# Patient Record
Sex: MALE | Race: WHITE | Employment: UNEMPLOYED | URBAN - METROPOLITAN AREA
[De-identification: names, ages, dates, MRNs, and addresses within clinical notes are randomized per-mention and may not be internally consistent; named-entity substitution may affect disease eponyms.]

---

## 2024-04-06 ENCOUNTER — OFFICE VISIT (OUTPATIENT)
Dept: URGENT CARE | Age: 79
End: 2024-04-06

## 2024-04-06 VITALS
DIASTOLIC BLOOD PRESSURE: 73 MMHG | TEMPERATURE: 98.9 F | BODY MASS INDEX: 34.72 KG/M2 | OXYGEN SATURATION: 94 % | SYSTOLIC BLOOD PRESSURE: 121 MMHG | WEIGHT: 262 LBS | HEIGHT: 73 IN | HEART RATE: 100 BPM

## 2024-04-06 DIAGNOSIS — J02.9 SORE THROAT: Primary | ICD-10-CM

## 2024-04-06 PROBLEM — R10.32 ABDOMINAL PAIN, BILATERAL LOWER QUADRANT: Status: ACTIVE | Noted: 2017-09-26

## 2024-04-06 PROBLEM — R10.31 ABDOMINAL PAIN, BILATERAL LOWER QUADRANT: Status: ACTIVE | Noted: 2017-09-26

## 2024-04-06 PROBLEM — R19.7 DIARRHEA: Status: ACTIVE | Noted: 2024-04-06

## 2024-04-06 PROBLEM — M15.9 GENERALIZED OSTEOARTHROSIS: Status: ACTIVE | Noted: 2024-04-06

## 2024-04-06 PROBLEM — L02.215 CUTANEOUS ABSCESS OF PERINEUM: Status: ACTIVE | Noted: 2024-04-06

## 2024-04-06 PROBLEM — L59.8 OTHER SPECIFIED DISORDERS OF THE SKIN AND SUBCUTANEOUS TISSUE RELATED TO RADIATION: Status: ACTIVE | Noted: 2024-04-06

## 2024-04-06 PROBLEM — K21.9 ESOPHAGEAL REFLUX: Status: ACTIVE | Noted: 2024-04-06

## 2024-04-06 PROBLEM — L02.31 ABSCESS, GLUTEAL, LEFT: Status: ACTIVE | Noted: 2019-10-25

## 2024-04-06 PROBLEM — Z93.3 COLOSTOMY STATUS (HCC): Status: ACTIVE | Noted: 2020-01-26

## 2024-04-06 PROBLEM — K59.09 CHRONIC CONSTIPATION: Status: ACTIVE | Noted: 2017-09-26

## 2024-04-06 PROBLEM — Z85.048 PERSONAL HISTORY OF OTHER MALIGNANT NEOPLASM OF RECTUM, RECTOSIGMOID JUNCTION, AND ANUS: Status: ACTIVE | Noted: 2024-04-06

## 2024-04-06 PROBLEM — D49.0 NEOPLASM OF DIGESTIVE SYSTEM: Status: ACTIVE | Noted: 2024-04-06

## 2024-04-06 PROBLEM — M54.50 LOW BACK PAIN: Status: ACTIVE | Noted: 2022-04-26

## 2024-04-06 PROBLEM — M47.819 ARTHROPATHY OF FACET JOINT: Status: ACTIVE | Noted: 2022-04-26

## 2024-04-06 PROBLEM — K65.1 INTRA-ABDOMINAL ABSCESS (HCC): Status: ACTIVE | Noted: 2019-09-26

## 2024-04-06 PROBLEM — K80.10 CHRONIC CHOLECYSTITIS WITH CALCULUS: Status: ACTIVE | Noted: 2017-10-07

## 2024-04-06 PROBLEM — K56.0 PARALYTIC ILEUS (HCC): Status: ACTIVE | Noted: 2017-09-26

## 2024-04-06 PROBLEM — T81.31XA DISRUPTION OF EXTERNAL OPERATION (SURGICAL) WOUND, NOT ELSEWHERE CLASSIFIED, INITIAL ENCOUNTER: Status: ACTIVE | Noted: 2024-04-06

## 2024-04-06 LAB — STREPTOCOCCUS A RNA: NEGATIVE

## 2024-04-06 RX ORDER — MELOXICAM 15 MG/1
1 TABLET ORAL DAILY
COMMUNITY
Start: 2024-02-29

## 2024-04-06 RX ORDER — PREDNISONE 20 MG/1
20 TABLET ORAL 2 TIMES DAILY
Qty: 10 TABLET | Refills: 0 | Status: SHIPPED | OUTPATIENT
Start: 2024-04-06 | End: 2024-04-11

## 2024-04-06 RX ORDER — MOMETASONE FUROATE 1 MG/G
CREAM TOPICAL DAILY
COMMUNITY
Start: 2023-02-01

## 2024-04-06 RX ORDER — LISINOPRIL 20 MG/1
20 TABLET ORAL DAILY
COMMUNITY
Start: 2024-03-06

## 2024-04-06 RX ORDER — DULOXETIN HYDROCHLORIDE 30 MG/1
30 CAPSULE, DELAYED RELEASE ORAL DAILY
COMMUNITY
Start: 2024-01-10

## 2024-04-06 RX ORDER — HYDROCHLOROTHIAZIDE 12.5 MG/1
1 CAPSULE, GELATIN COATED ORAL EVERY MORNING
COMMUNITY
Start: 2024-01-16

## 2024-04-06 RX ORDER — MIRABEGRON 50 MG/1
50 TABLET, FILM COATED, EXTENDED RELEASE ORAL DAILY
COMMUNITY

## 2024-04-06 NOTE — PROGRESS NOTES
Sea Mahajan (:  1945) is a 78 y.o. male,New patient, here for evaluation of the following chief complaint(s):  Congestion (Sinus congestion + pressure, sore throat, headache, minor productive cough x 3-4 days. Denies fever or bodyaches. )      ASSESSMENT/PLAN:    ICD-10-CM    1. Sore throat  J02.9 POCT Rapid Strep A DNA     predniSONE (DELTASONE) 20 MG tablet        Results for POC orders placed in visit on 24   POCT Rapid Strep A DNA   Result Value Ref Range    Streptococcus A RNA Negative       No indication for antibiotic at this time   Suspect viral or allergies as  cause for symptoms     Keep hydrated, tylenol or ibuprofen (if no contraindications) as needed if pain or fever..  follow up in  5-7 days if not better  gargle-cool liquids-ice pops  Over the counter zyrtex/ flonase  Return sooner  if symptoms worse/feeling worse or has new symptoms or concerns      SUBJECTIVE/OBJECTIVE:  Patient presents with:  Congestion: Sinus congestion + pressure, sore throat, headache, minor productive cough x 3-4 days. Denies fever or bodyaches.          History provided by:  Patient      Vitals:    24 1207   BP: 121/73   Site: Left Upper Arm   Position: Sitting   Cuff Size: Large Adult   Pulse: 100   Temp: 98.9 °F (37.2 °C)   TempSrc: Oral   SpO2: 94%   Weight: 118.8 kg (262 lb)   Height: 1.854 m (6' 1\")       Review of Systems   Constitutional:  Negative for fever.   HENT:  Positive for sinus pressure and sore throat.    Musculoskeletal:  Negative for myalgias.   Neurological:  Positive for headaches.       Physical Exam    Physical  Vitals signs: reviewed  Constitutional:  appearance: well nourished ..  does not appear acutely ill     Eyes:                 Pupil: equal-round-reactive to light, no photophobia, EOMI            Cornea: clear            Sclera: clear, non injected, non icteric    Ears: Right canal clear / TM normal           Left ear canal clear / TM normal  Nose/Sinus:  no nasal

## 2024-04-06 NOTE — PATIENT INSTRUCTIONS
Keep hydrated, tylenol or ibuprofen (if no contraindications) as needed if pain or fever..  follow up in  5-7 days if not better  gargle-cool liquids-ice pops  Over the counter zyrtex/ flonase  Return sooner  if symptoms worse/feeling worse or has new symptoms or concerns